# Patient Record
Sex: FEMALE | Race: WHITE
[De-identification: names, ages, dates, MRNs, and addresses within clinical notes are randomized per-mention and may not be internally consistent; named-entity substitution may affect disease eponyms.]

---

## 2017-06-24 ENCOUNTER — HEALTH MAINTENANCE LETTER (OUTPATIENT)
Age: 22
End: 2017-06-24

## 2017-08-08 ENCOUNTER — HOSPITAL ENCOUNTER (EMERGENCY)
Facility: CLINIC | Age: 22
Discharge: HOME | End: 2017-08-09
Attending: EMERGENCY MEDICINE | Admitting: EMERGENCY MEDICINE
Payer: COMMERCIAL

## 2017-08-08 VITALS
BODY MASS INDEX: 21.97 KG/M2 | OXYGEN SATURATION: 98 % | SYSTOLIC BLOOD PRESSURE: 150 MMHG | RESPIRATION RATE: 16 BRPM | TEMPERATURE: 96.8 F | WEIGHT: 140 LBS | HEIGHT: 67 IN | DIASTOLIC BLOOD PRESSURE: 84 MMHG

## 2017-08-08 DIAGNOSIS — Z90.89 STATUS POST TONSILLECTOMY: ICD-10-CM

## 2017-08-08 PROCEDURE — 99282 EMERGENCY DEPT VISIT SF MDM: CPT

## 2017-08-08 NOTE — ED AVS SNAPSHOT
[facility] Emergency Department    [address] [place] [address]    Phone:  [phone]    Fax:  [phone]                                       Dennise Lara   MRN: [id number]    Department:  [facility] Emergency Department   Date of Visit:  8/8/2017           Patient Information     Date Of Birth          1995        Your diagnoses for this visit were:     Status post tonsillectomy        You were seen by Kelsie Gooden MD.      Follow-up Information     Follow up with Dayna Resendez MD.    Specialty:  Family Practice    Why:  As needed    Contact information:    [address] [place] [address]  [phone]          Follow up with Elio Arthur MD. Call in 1 day.    Specialty:  Otolaryngology    Why:  Consider moving your follow up appointment sooner    Contact information:    [facility] OTOLARYNGOLOGY  [address] [place] [address]  [phone]          Discharge Instructions       Call your surgeon tomorrow.  Continue soft diet and pain control.  Return if you have bleeding that will not stop or if you have fever or any other concerning symptoms.     Discharge References/Attachments     ADULT TONSILLECTOMY (ENGLISH)    TONSILLECTOMY, POST OP BLEEDING (ENGLISH)      24 Hour Appointment Hotline       To make an appointment at any [facility], call [phone] ([phone]). If you don't have a family doctor or clinic, we will help you find one. [facility] clinics are conveniently located to serve the needs of you and your family.             Review of your medicines      Our records show that you are taking the medicines listed below. If these are incorrect, please call your family doctor or clinic.        Dose / Directions Last dose taken    multivitamin per tablet   Quantity:  100        ONE DAILY   Refills:  1 YEAR        OXYCODONE HCL PO        Refills:  0                Orders Needing Specimen Collection     None      Pending  Results     No orders found from 8/6/2017 to 8/9/2017.            Pending Culture Results     No orders found from 8/6/2017 to 8/9/2017.            Pending Results Instructions     If you had any lab results that were not finalized at the time of your Discharge, you can call the ED Lab Result RN at [phone]. You will be contacted by this team for any positive Lab results or changes in treatment. The nurses are available 7 days a week from 10A to 6:30P.  You can leave a message 24 hours per day and they will return your call.        Test Results From Your Hospital Stay               Clinical Quality Measure: Blood Pressure Screening     Your blood pressure was checked while you were in the emergency department today. The last reading we obtained was  BP: 150/84 . Please read the guidelines below about what these numbers mean and what you should do about them.  If your systolic blood pressure (the top number) is less than 120 and your diastolic blood pressure (the bottom number) is less than 80, then your blood pressure is normal. There is nothing more that you need to do about it.  If your systolic blood pressure (the top number) is 120-139 or your diastolic blood pressure (the bottom number) is 80-89, your blood pressure may be higher than it should be. You should have your blood pressure rechecked within a year by a primary care provider.  If your systolic blood pressure (the top number) is 140 or greater or your diastolic blood pressure (the bottom number) is 90 or greater, you may have high blood pressure. High blood pressure is treatable, but if left untreated over time it can put you at risk for heart attack, stroke, or kidney failure. You should have your blood pressure rechecked by a primary care provider within the next 4 weeks.  If your provider in the emergency department today gave you specific instructions to follow-up with your doctor or provider even sooner than that, you should follow that  "instruction and not wait for up to 4 weeks for your follow-up visit.        Thank you for choosing [facility]       Thank you for choosing [facility] for your care. Our goal is always to provide you with excellent care. Hearing back from our patients is one way we can continue to improve our services. Please take a few minutes to complete the written survey that you may receive in the mail after you visit with us. Thank you!        [employer] Information     [employer] lets you send messages to your doctor, view your test results, renew your prescriptions, schedule appointments and more. To sign up, go to [email or web address] . Click on \"Log in\" on the left side of the screen, which will take you to the Welcome page. Then click on \"Sign up Now\" on the right side of the page.     You will be asked to enter the access code listed below, as well as some personal information. Please follow the directions to create your username and password.     Your access code is: [email or web address]  Expires: 2017 11:43 PM     Your access code will  in 90 days. If you need help or a new code, please call your [facility] clinic or [phone].        Care EveryWhere ID     This is your Care EveryWhere ID. This could be used by other organizations to access your [facility] medical records  [id number]        Equal Access to Services     JAIMEE ROBINS : Henry Martinez, waaxda luqadaha, qaybta kaalmada adejorge a, roxana moseley . So [facility] [phone].    ATENCIÓN: Si habla español, tiene a issa disposición servicios gratuitos de asistencia lingüística. Llame al [phone].    We comply with applicable federal civil rights laws and [place] laws. We do not discriminate on the basis of race, color, national origin, age, disability sex, sexual orientation or gender identity.            After Visit Summary       This is your record. Keep this with you and show to your community pharmacist(s) and doctor(s) " at your next visit.

## 2017-08-08 NOTE — ED AVS SNAPSHOT
[facility] Emergency Department    [address]    [facility] [address]    Phone:  [phone]    Fax:  [phone]                                       Dennise Lara   MRN: [id number]    Department:  [facility] Emergency Department   Date of Visit:  8/8/2017           After Visit Summary Signature Page     I have received my discharge instructions, and my questions have been answered. I have discussed any challenges I see with this plan with the nurse or doctor.    ..........................................................................................................................................  Patient/Patient Representative Signature      ..........................................................................................................................................  Patient Representative Print Name and Relationship to Patient    ..................................................               ................................................  Date                                            Time    ..........................................................................................................................................  Reviewed by Signature/Title    ...................................................              ..............................................  Date                                                            Time

## 2017-08-09 ASSESSMENT — ENCOUNTER SYMPTOMS
FEVER: 0
VOMITING: 0

## 2017-08-09 NOTE — DISCHARGE INSTRUCTIONS
Call your surgeon tomorrow.  Continue soft diet and pain control.  Return if you have bleeding that will not stop or if you have fever or any other concerning symptoms.

## 2017-08-09 NOTE — ED PROVIDER NOTES
"  History     Chief Complaint:  Post Operative Bleeding    The history is provided by the patient and a parent.      Dennise Lara is a 21 year old female six days status post uncomplicated tonsillectomy who presents to the emergency department with her mother for evaluation of post operative bleeding. The patient states that she has been healing generally well, with only slight postoperative pain well managed with oxycodone, Tylenol, and ibuprofen. However, this evening the patient has had episode of bleeding from her surgical site, which lasted approximately 30-60 minutes in duration. The patient denies any recent fevers or vomiting. On arrival to the ED, the patient states that she believes the bleeding has generally resolved.     Allergies:  Acyclovir  Zithromax [Azithromycin]    Medications:    Oxycodone    Past Medical History:    Allergic rhinitis  Shingles   Varicella    Past Surgical History:    Tonsillectomy    Family History:    No past pertinent family history.    Social History:  Presents with her mother.   Negative for tobacco use.  Negative for alcohol use.  Denies illicit drug use.  Attends college.  Marital Status:  Single [1]    Review of Systems   Constitutional: Negative for fever.   HENT:        Positive for post operative site bleeding (resolved).    Gastrointestinal: Negative for vomiting.     Physical Exam   First Vitals:  BP: 150/84  Heart Rate: 75  Temp: 96.8  F (36  C)  Resp: 16  Height: 170.2 cm (5' 7\")  Weight: 63.5 kg (140 lb)  SpO2: 98 %    Physical Exam  General: Well-developed and well-nourished; well appearing young ; cooperative  Head:  Atraumatic  Eyes:  Extraocular movements intact; conjunctivae, lids, and sclerae are normal  ENT:    Normal nose; moist mucous membranes R tonsillar recess is well healing and without notable findings; R tonsillar recess has a small area inferiorly where there appears to have been recent bleeding; no active bleeding  Neck:  Supple; normal " range of motion  CV:  Regular rate and rhythm; normal heart sounds with no murmurs, rubs, or gallops detected  Resp:  No respiratory distress; clear to auscultation bilaterally without decreased breath sounds, wheezing, rales, or rhonchi  GI:  Soft; non-distended; non-tender    MS:  Normal ROM; no bilateral lower extremity edema  Skin:  Warm; non-diaphoretic; no pallor  Neuro: Awake; A&Ox3; normal strength  Psych:  Normal mood and affect; normal speech  Vitals reviewed.    Emergency Department Course     Emergency Department Course:  Nursing notes and vitals reviewed. I performed an exam of the patient as documented above.     Findings and plan explained to the Patient. Patient discharged home with instructions regarding supportive care, medications, and reasons to return. The importance of close follow-up was reviewed.     Impression & Plan    Medical Decision Making:  Dennise Lara is a 21 year old female who is POD 6 status post tonsillectomy. The patient states that she had about an hour of what sounds like minimal to moderate posterior oropharyngeal bleeding that has spontaneously resolved. On exam, she has no active bleeding. Her surgical sites are very well appearing. I had the patient drink and reevaluated and there is no evidence of active bleed. The patient has no other complaints. I instructed the patient to continue her soft diet and pain control regimen. I instructed her to call her surgeon tomorrow to move her follow up appointment to sooner (currently scheduled for 6 days from now) as she has had this bleeding. I instructed her to return to the ER should the bleeding return and not spontaneously resolve. The patient and her mother verbalized understanding of this plan after all of their questions were answered. They are amenable to discharge.     Diagnosis:    ICD-10-CM   1. Status post tonsillectomy Z90.89       Disposition:  Discharged to home    Lynette GARCIA, am serving as a scribe on  8/8/2017 at 11:34 PM to personally document services performed by Kelise Gooden MD based on my observations and the provider's statements to me.       Lynette Owusu  8/8/2017   [facility] EMERGENCY DEPARTMENT       Kelsie Gooden MD  08/09/17 0207

## 2017-08-09 NOTE — ED NOTES
Patient presents to ED due post op complication. States had tonsilis removed last wed, has been swallowing blood. Denies fever    ABC intact  A/O x4     Reports taking     Tylenol 2200

## 2017-08-10 ENCOUNTER — ANESTHESIA EVENT (OUTPATIENT)
Dept: SURGERY | Facility: CLINIC | Age: 22
End: 2017-08-10
Payer: COMMERCIAL

## 2017-08-10 ENCOUNTER — ANESTHESIA (OUTPATIENT)
Dept: SURGERY | Facility: CLINIC | Age: 22
End: 2017-08-10
Payer: COMMERCIAL

## 2017-08-10 ENCOUNTER — HOSPITAL ENCOUNTER (OUTPATIENT)
Facility: CLINIC | Age: 22
Discharge: HOME | End: 2017-08-10
Attending: EMERGENCY MEDICINE | Admitting: OTOLARYNGOLOGY
Payer: COMMERCIAL

## 2017-08-10 VITALS
WEIGHT: 140 LBS | OXYGEN SATURATION: 97 % | RESPIRATION RATE: 16 BRPM | SYSTOLIC BLOOD PRESSURE: 122 MMHG | TEMPERATURE: 98.4 F | DIASTOLIC BLOOD PRESSURE: 74 MMHG | HEART RATE: 75 BPM | BODY MASS INDEX: 21.93 KG/M2

## 2017-08-10 DIAGNOSIS — J95.830 POST-TONSILLECTOMY HEMORRHAGE: ICD-10-CM

## 2017-08-10 LAB
ANION GAP SERPL CALCULATED.3IONS-SCNC: 8 MMOL/L (ref 3–14)
BUN SERPL-MCNC: 14 MG/DL (ref 7–30)
CALCIUM SERPL-MCNC: 9.1 MG/DL (ref 8.5–10.1)
CHLORIDE SERPL-SCNC: 102 MMOL/L (ref 96–110)
CO2 SERPL-SCNC: 28 MMOL/L (ref 20–32)
CREAT SERPL-MCNC: 0.76 MG/DL (ref 0.5–1)
ERYTHROCYTE [DISTWIDTH] IN BLOOD BY AUTOMATED COUNT: 14.5 % (ref 10–15)
GFR SERPL CREATININE-BSD FRML MDRD: NORMAL ML/MIN/1.7M2
GLUCOSE SERPL-MCNC: 96 MG/DL (ref 70–99)
HCG SERPL QL: NEGATIVE
HCT VFR BLD AUTO: 38.5 % (ref 35–47)
HGB BLD-MCNC: 12.1 G/DL (ref 11.7–15.7)
MCH RBC QN AUTO: 25.9 PG (ref 26.5–33)
MCHC RBC AUTO-ENTMCNC: 31.4 G/DL (ref 31.5–36.5)
MCV RBC AUTO: 82 FL (ref 78–100)
PLATELET # BLD AUTO: 254 10E9/L (ref 150–450)
POTASSIUM SERPL-SCNC: 3.7 MMOL/L (ref 3.4–5.3)
RBC # BLD AUTO: 4.67 10E12/L (ref 3.8–5.2)
SODIUM SERPL-SCNC: 138 MMOL/L (ref 133–144)
WBC # BLD AUTO: 5.1 10E9/L (ref 4–11)

## 2017-08-10 PROCEDURE — 36000050 ZZH SURGERY LEVEL 2 1ST 30 MIN: Performed by: OTOLARYNGOLOGY

## 2017-08-10 PROCEDURE — 71000027 ZZH RECOVERY PHASE 2 EACH 15 MINS: Performed by: OTOLARYNGOLOGY

## 2017-08-10 PROCEDURE — 85027 COMPLETE CBC AUTOMATED: CPT | Performed by: EMERGENCY MEDICINE

## 2017-08-10 PROCEDURE — 25000125 ZZHC RX 250: Performed by: NURSE ANESTHETIST, CERTIFIED REGISTERED

## 2017-08-10 PROCEDURE — 37000009 ZZH ANESTHESIA TECHNICAL FEE, EACH ADDTL 15 MIN: Performed by: OTOLARYNGOLOGY

## 2017-08-10 PROCEDURE — 80048 BASIC METABOLIC PNL TOTAL CA: CPT | Performed by: EMERGENCY MEDICINE

## 2017-08-10 PROCEDURE — 84703 CHORIONIC GONADOTROPIN ASSAY: CPT | Performed by: EMERGENCY MEDICINE

## 2017-08-10 PROCEDURE — 27210794 ZZH OR GENERAL SUPPLY STERILE: Performed by: OTOLARYNGOLOGY

## 2017-08-10 PROCEDURE — 25000132 ZZH RX MED GY IP 250 OP 250 PS 637: Performed by: ANESTHESIOLOGY

## 2017-08-10 PROCEDURE — 25000128 H RX IP 250 OP 636: Performed by: EMERGENCY MEDICINE

## 2017-08-10 PROCEDURE — 37000008 ZZH ANESTHESIA TECHNICAL FEE, 1ST 30 MIN: Performed by: OTOLARYNGOLOGY

## 2017-08-10 PROCEDURE — 25000128 H RX IP 250 OP 636: Performed by: ANESTHESIOLOGY

## 2017-08-10 PROCEDURE — 25000128 H RX IP 250 OP 636: Performed by: NURSE ANESTHETIST, CERTIFIED REGISTERED

## 2017-08-10 PROCEDURE — 25000566 ZZH SEVOFLURANE, EA 15 MIN: Performed by: OTOLARYNGOLOGY

## 2017-08-10 PROCEDURE — 71000013 ZZH RECOVERY PHASE 1 LEVEL 1 EA ADDTL HR: Performed by: OTOLARYNGOLOGY

## 2017-08-10 PROCEDURE — 71000012 ZZH RECOVERY PHASE 1 LEVEL 1 FIRST HR: Performed by: OTOLARYNGOLOGY

## 2017-08-10 PROCEDURE — 99285 EMERGENCY DEPT VISIT HI MDM: CPT | Mod: 25

## 2017-08-10 PROCEDURE — 40000306 ZZH STATISTIC PRE PROC ASSESS II: Performed by: OTOLARYNGOLOGY

## 2017-08-10 PROCEDURE — 27210995 ZZH RX 272: Performed by: OTOLARYNGOLOGY

## 2017-08-10 PROCEDURE — 96374 THER/PROPH/DIAG INJ IV PUSH: CPT | Mod: 59

## 2017-08-10 PROCEDURE — 36000052 ZZH SURGERY LEVEL 2 EA 15 ADDTL MIN: Performed by: OTOLARYNGOLOGY

## 2017-08-10 RX ORDER — LABETALOL HYDROCHLORIDE 5 MG/ML
INJECTION, SOLUTION INTRAVENOUS PRN
Status: DISCONTINUED | OUTPATIENT
Start: 2017-08-10 | End: 2017-08-10

## 2017-08-10 RX ORDER — DEXAMETHASONE SODIUM PHOSPHATE 4 MG/ML
INJECTION, SOLUTION INTRA-ARTICULAR; INTRALESIONAL; INTRAMUSCULAR; INTRAVENOUS; SOFT TISSUE PRN
Status: DISCONTINUED | OUTPATIENT
Start: 2017-08-10 | End: 2017-08-10

## 2017-08-10 RX ORDER — HYDROMORPHONE HYDROCHLORIDE 1 MG/ML
.3-.5 INJECTION, SOLUTION INTRAMUSCULAR; INTRAVENOUS; SUBCUTANEOUS EVERY 10 MIN PRN
Status: DISCONTINUED | OUTPATIENT
Start: 2017-08-10 | End: 2017-08-10 | Stop reason: HOSPADM

## 2017-08-10 RX ORDER — FENTANYL CITRATE 50 UG/ML
25-50 INJECTION, SOLUTION INTRAMUSCULAR; INTRAVENOUS
Status: DISCONTINUED | OUTPATIENT
Start: 2017-08-10 | End: 2017-08-10 | Stop reason: HOSPADM

## 2017-08-10 RX ORDER — SODIUM CHLORIDE, SODIUM LACTATE, POTASSIUM CHLORIDE, CALCIUM CHLORIDE 600; 310; 30; 20 MG/100ML; MG/100ML; MG/100ML; MG/100ML
INJECTION, SOLUTION INTRAVENOUS CONTINUOUS
Status: DISCONTINUED | OUTPATIENT
Start: 2017-08-10 | End: 2017-08-10 | Stop reason: HOSPADM

## 2017-08-10 RX ORDER — ONDANSETRON 4 MG/1
4 TABLET, ORALLY DISINTEGRATING ORAL EVERY 30 MIN PRN
Status: DISCONTINUED | OUTPATIENT
Start: 2017-08-10 | End: 2017-08-10 | Stop reason: HOSPADM

## 2017-08-10 RX ORDER — PROPOFOL 10 MG/ML
INJECTION, EMULSION INTRAVENOUS PRN
Status: DISCONTINUED | OUTPATIENT
Start: 2017-08-10 | End: 2017-08-10

## 2017-08-10 RX ORDER — OXYCODONE HCL 5 MG/5 ML
10 SOLUTION, ORAL ORAL ONCE
Status: COMPLETED | OUTPATIENT
Start: 2017-08-10 | End: 2017-08-10

## 2017-08-10 RX ORDER — ONDANSETRON 2 MG/ML
4 INJECTION INTRAMUSCULAR; INTRAVENOUS ONCE
Status: COMPLETED | OUTPATIENT
Start: 2017-08-10 | End: 2017-08-10

## 2017-08-10 RX ORDER — DEXAMETHASONE SODIUM PHOSPHATE 10 MG/ML
10 INJECTION, SOLUTION INTRAMUSCULAR; INTRAVENOUS ONCE
Status: DISCONTINUED | OUTPATIENT
Start: 2017-08-10 | End: 2017-08-10 | Stop reason: HOSPADM

## 2017-08-10 RX ORDER — SODIUM CHLORIDE 9 MG/ML
INJECTION, SOLUTION INTRAVENOUS CONTINUOUS PRN
Status: DISCONTINUED | OUTPATIENT
Start: 2017-08-10 | End: 2017-08-10

## 2017-08-10 RX ORDER — LIDOCAINE HYDROCHLORIDE 10 MG/ML
INJECTION, SOLUTION INFILTRATION; PERINEURAL PRN
Status: DISCONTINUED | OUTPATIENT
Start: 2017-08-10 | End: 2017-08-10

## 2017-08-10 RX ORDER — NALOXONE HYDROCHLORIDE 0.4 MG/ML
.1-.4 INJECTION, SOLUTION INTRAMUSCULAR; INTRAVENOUS; SUBCUTANEOUS
Status: DISCONTINUED | OUTPATIENT
Start: 2017-08-10 | End: 2017-08-10 | Stop reason: HOSPADM

## 2017-08-10 RX ORDER — MEPERIDINE HYDROCHLORIDE 25 MG/ML
12.5 INJECTION INTRAMUSCULAR; INTRAVENOUS; SUBCUTANEOUS
Status: DISCONTINUED | OUTPATIENT
Start: 2017-08-10 | End: 2017-08-10 | Stop reason: HOSPADM

## 2017-08-10 RX ORDER — FENTANYL CITRATE 50 UG/ML
INJECTION, SOLUTION INTRAMUSCULAR; INTRAVENOUS PRN
Status: DISCONTINUED | OUTPATIENT
Start: 2017-08-10 | End: 2017-08-10

## 2017-08-10 RX ORDER — LIDOCAINE 40 MG/G
CREAM TOPICAL
Status: DISCONTINUED | OUTPATIENT
Start: 2017-08-10 | End: 2017-08-10 | Stop reason: HOSPADM

## 2017-08-10 RX ORDER — ONDANSETRON 2 MG/ML
INJECTION INTRAMUSCULAR; INTRAVENOUS PRN
Status: DISCONTINUED | OUTPATIENT
Start: 2017-08-10 | End: 2017-08-10

## 2017-08-10 RX ORDER — ONDANSETRON 2 MG/ML
4 INJECTION INTRAMUSCULAR; INTRAVENOUS EVERY 30 MIN PRN
Status: DISCONTINUED | OUTPATIENT
Start: 2017-08-10 | End: 2017-08-10 | Stop reason: HOSPADM

## 2017-08-10 RX ADMIN — SODIUM CHLORIDE, POTASSIUM CHLORIDE, SODIUM LACTATE AND CALCIUM CHLORIDE: 600; 310; 30; 20 INJECTION, SOLUTION INTRAVENOUS at 04:12

## 2017-08-10 RX ADMIN — SODIUM CHLORIDE: 9 INJECTION, SOLUTION INTRAVENOUS at 02:46

## 2017-08-10 RX ADMIN — ONDANSETRON 4 MG: 2 INJECTION INTRAMUSCULAR; INTRAVENOUS at 02:40

## 2017-08-10 RX ADMIN — OXYCODONE HYDROCHLORIDE 10 MG: 5 SOLUTION ORAL at 06:15

## 2017-08-10 RX ADMIN — SODIUM CHLORIDE, POTASSIUM CHLORIDE, SODIUM LACTATE AND CALCIUM CHLORIDE: 600; 310; 30; 20 INJECTION, SOLUTION INTRAVENOUS at 03:50

## 2017-08-10 RX ADMIN — Medication 100 MG: at 03:08

## 2017-08-10 RX ADMIN — MEPERIDINE HYDROCHLORIDE 12.5 MG: 25 INJECTION, SOLUTION INTRAMUSCULAR; INTRAVENOUS; SUBCUTANEOUS at 04:22

## 2017-08-10 RX ADMIN — ONDANSETRON 4 MG: 2 INJECTION INTRAMUSCULAR; INTRAVENOUS at 03:59

## 2017-08-10 RX ADMIN — MIDAZOLAM HYDROCHLORIDE 2 MG: 1 INJECTION, SOLUTION INTRAMUSCULAR; INTRAVENOUS at 03:05

## 2017-08-10 RX ADMIN — FENTANYL CITRATE 100 MCG: 50 INJECTION, SOLUTION INTRAMUSCULAR; INTRAVENOUS at 03:08

## 2017-08-10 RX ADMIN — ROCURONIUM BROMIDE 7 MG: 10 INJECTION INTRAVENOUS at 03:08

## 2017-08-10 RX ADMIN — FENTANYL CITRATE 50 MCG: 50 INJECTION, SOLUTION INTRAMUSCULAR; INTRAVENOUS at 04:23

## 2017-08-10 RX ADMIN — PROPOFOL 50 MG: 10 INJECTION, EMULSION INTRAVENOUS at 03:16

## 2017-08-10 RX ADMIN — SODIUM CHLORIDE 1000 ML: 9 INJECTION, SOLUTION INTRAVENOUS at 02:40

## 2017-08-10 RX ADMIN — DEXAMETHASONE SODIUM PHOSPHATE 4 MG: 4 INJECTION, SOLUTION INTRA-ARTICULAR; INTRALESIONAL; INTRAMUSCULAR; INTRAVENOUS; SOFT TISSUE at 03:08

## 2017-08-10 RX ADMIN — LABETALOL HYDROCHLORIDE 5 MG: 5 INJECTION, SOLUTION INTRAVENOUS at 03:45

## 2017-08-10 RX ADMIN — PROPOFOL 150 MG: 10 INJECTION, EMULSION INTRAVENOUS at 03:08

## 2017-08-10 RX ADMIN — FENTANYL CITRATE 50 MCG: 50 INJECTION, SOLUTION INTRAMUSCULAR; INTRAVENOUS at 06:15

## 2017-08-10 RX ADMIN — FENTANYL CITRATE 50 MCG: 50 INJECTION, SOLUTION INTRAMUSCULAR; INTRAVENOUS at 05:00

## 2017-08-10 RX ADMIN — LIDOCAINE HYDROCHLORIDE 30 MG: 10 INJECTION, SOLUTION INFILTRATION; PERINEURAL at 03:08

## 2017-08-10 ASSESSMENT — ENCOUNTER SYMPTOMS
SEIZURES: 0
DYSRHYTHMIAS: 0

## 2017-08-10 ASSESSMENT — COPD QUESTIONNAIRES: COPD: 0

## 2017-08-10 NOTE — IP AVS SNAPSHOT
[facility] PreOP/PostOP    [address]    [facility] [address]    Phone:  [phone]    Fax:  [phone]                                       After Visit Summary   8/10/2017    Dennise Lara    MRN: [id number]           After Visit Summary Signature Page     I have received my discharge instructions, and my questions have been answered. I have discussed any challenges I see with this plan with the nurse or doctor.    ..........................................................................................................................................  Patient/Patient Representative Signature      ..........................................................................................................................................  Patient Representative Print Name and Relationship to Patient    ..................................................               ................................................  Date                                            Time    ..........................................................................................................................................  Reviewed by Signature/Title    ...................................................              ..............................................  Date                                                            Time

## 2017-08-10 NOTE — ED PROVIDER NOTES
History     Chief Complaint:  Post-op Problem      The history is provided by the patient.      Dennise Lara is a 21 year old female who presents with post-op problems. On 08/02/17 the patient had a tonsillectomy at [facility] by Dr. Arthur. The patient presented to the emergency department last night for left-sided post-op tonsillectomy bleeding. Tonight she presents with right-sided bleeding with some relief from gargling ice water. She is currently on oxycodone prescribed for pain after the surgery. She has no familial history of bleeding disorder. She has no other complaints     Allergies:  Acyclovir  Zithromax [Azithromycin]     Medications:    Oxycodone     Past Medical History:    Allergic rhinitis  Shingles    Past Surgical History:    Tonsillectomy 8/2/17    Family History:    Allergies    Social History:  Presents with mother   Tobacco use: Never  Alcohol use: Negative  PCP: Angelica Mcknight    Marital Status:  Single    Review of Systems   HENT:        (+) Tonsillar bleeding    All other systems reviewed and are negative.      Physical Exam     Patient Vitals for the past 24 hrs:   BP Temp Temp src Pulse Resp SpO2 Weight   08/10/17 0245 146/84 - - - 18 100 % -   08/10/17 0234 - - - - - 99 % -   08/10/17 0220 - - - - - 99 % -   08/10/17 0206 - - - - - 98 % -   08/10/17 0156 128/74 98  F (36.7  C) Oral 75 17 99 % 63.5 kg (140 lb)   08/10/17 0152 131/82 - - - - - -        Physical Exam  Nursing note and vitals reviewed.  Constitutional: Cooperative. Sitting blood into emesis basin.  HENT:   Mouth/Throat: Mucous membranes are normal. Large clot burden in the left tonsillar pillar. Active bleeding from the right.   Cardiovascular: Normal rate, regular rhythm and normal heart sounds.  No murmur.  Pulmonary/Chest: Effort normal and breath sounds normal. No respiratory distress.   Abdominal: Soft. Normal appearance. There is no tenderness.   Neurological: Alert.   Skin: Skin is warm and dry.   Psychiatric:  Normal mood and affect.      Emergency Department Course   Laboratory:  CBC: WNL (WBC 5.1, HGB 12.1, )   BMP: WNL (Creatinine 0.76)     HCG qual: Negative    Interventions:  0240: NS 1L IV Bolus    0240: Zofran 4 mg IV      Emergency Department Course:  Past medical records, nursing notes, and vitals reviewed.  0215: I performed an exam of the patient and obtained history, as documented above.   IV inserted and blood drawn.   0222: Discussed patient with Dr. Arthur, surgeon who performed tonsillectomy. They agreed to come see patient.   Patient transferred to OR under care of Dr. Arthur.     Impression & Plan    Medical Decision Making:  Dennise Lara is a 21 year old female post op day 8 from a tonsillectomy who presents with post-tonsillectomy hemorrhage. There is large clot burden in the left tonsillar pillar as well as active bleeding from the right. Due to this being her second visit for hemorrhage in the last 24 hours I did discuss the case with her ENT who has graciously agreed to come in and evaluate her in the operative setting. We will send labs, obtain a pregnancy test, and provide fluids as well as nausea medication in the interim. She is hemodynamically stable and will be transferred to the OR in stable condition for definitive management.     Diagnosis:    ICD-10-CM   1. Post-tonsillectomy hemorrhage J95.89       Disposition:  Patient brought to OR.       Darren Funk  8/10/2017   [facility] EMERGENCY DEPARTMENT  Darren GARCIA, am serving as a scribe at 2:17 AM on 8/10/2017 to document services personally performed by Michel Echeverria MD based on my observations and the provider's statements to me.        Michel Echeverria MD  08/10/17 0411

## 2017-08-10 NOTE — ED NOTES
Was seen last night with bleeding left side  So now for past 40 min  Again bleeding   Looks more like left side now   Here for eval   Did give ice water has been swish and spit  Bleeding slowing down

## 2017-08-10 NOTE — BRIEF OP NOTE
[facility] Brief Operative Note    Pre-operative diagnosis: postop tonsil bleed    Post-operative diagnosis same   Procedure: Procedure(s):  control of post op tonsil bleed  - Wound Class: II-Clean Contaminated   Surgeon(s): Surgeon(s) and Role:     * Elio Arthur MD - Primary   Estimated blood loss: * No values recorded between 8/10/2017  3:14 AM and 8/10/2017  4:03 AM *    Specimens: * No specimens in log *   Findings: Diffuse granulation tissue bleed bilaterally with a single upper pole vessel on the left

## 2017-08-10 NOTE — IP AVS SNAPSHOT
MRN:[id number]                      After Visit Summary   8/10/2017    Dennise Lara    MRN: [id number]           Thank you!     Thank you for choosing [facility] for your care. Our goal is always to provide you with excellent care. Hearing back from our patients is one way we can continue to improve our services. Please take a few minutes to complete the written survey that you may receive in the mail after you visit. If you would like to speak to someone directly about your visit please contact Patient Relations at [phone]. Thank you!          Patient Information     Date Of Birth          1995        About your hospital stay     You were admitted on:  N/A You last received care in the:  [facility] PreOP/PostOP    You were discharged on:  August 10, 2017       Who to Call     For medical emergencies, please call 911.  For non-urgent questions about your medical care, please call your primary care provider or clinic, [phone]  For questions related to your surgery, please call your surgery clinic        Attending Provider     Provider Specialty    Michel Echeverria MD Emergency Medicine       Primary Care Provider Office Phone # Fax #    Angelica Mcknight [phone]      After Care Instructions     Diet Instructions       Soft diet as tolerated            Discharge Instructions        Return to clinic as instructed by Physician                  Further instructions from your care team       TONSILLECTOMY AND ADENOIDECTOMY   DISCHARGE INSTRUCTIONS  [facility]   Berhane Ac M.D.    Tim Jimenes M.D.  Elio Arthur M.D.       Michel Thompson M.D.   Heather Reyes M.D.          hPani Jasso M.D.                                                                                                        1. Patients should eat only soft foods for at least one week after surgery.     2. Avoid citrus juices (which may burn) or any food which  might scratch the throat and cause bleeding. The sooner patients eat and chew, the sooner they will feel better.    3. If the patient is nauseated or vomiting give clear liquids only, and avoid dairy products or other fatty foods. Use plain Tylenol instead of the narcotic pain medication.  Hold the narcotic until nausea has passed and oral intake has improved.  When you restart the narcotic pain medication, stop the Tylenol.    4. Patients may run a fever for up to 10 days after surgery.  This may occasionally be as high as 101 degrees.  Contact your physician for a persisting fever in this range.    5. Patients may complain of an earache. This is usually referred pain from the throat and may be especially prominent around day six or seven after surgery, which is often when throat pain from tonsillectomy peaks.        6. Tylenol may be substituted for your prescription pain medication.  Do not give Tylenol in addition to your prescription because an overdose of acetaminophen may occur.  Ibuprofen may in some cases be used to supplement your narcotic pain medication.  Contact your surgeon prior to starting ibuprofen to discuss if this is a good choice for you.  There may be a slightly increased risk of post operative bleeding with ibuprofen use.     7. Observe patient for difficulty breathing which may be caused by airway swelling or sedation from narcotic pain medication.    8. Patients should avoid any strenuous activity such as heavy lifting, active sports or games for at least two weeks.  It is best to stay in the [facility] area for the two week healing period.    9. Notify the doctor if there is any bleeding. Bleeding may be helped by gargling ice water if possible.    10. White areas will appear in the back of the throat after the tonsils have been removed. This is normal and will gradually disappear.    11. The patient s breath may have a foul odor.  This may be present for ten days.     12. If you  have any problems or questions, please call [phone], 24 hours a day.  GENERAL ANESTHESIA OR SEDATION ADULT DISCHARGE INSTRUCTIONS   SPECIAL PRECAUTIONS FOR 24 HOURS AFTER SURGERY    IT IS NOT UNUSUAL TO FEEL LIGHT-HEADED OR FAINT, UP TO 24 HOURS AFTER SURGERY OR WHILE TAKING PAIN MEDICATION.  IF YOU HAVE THESE SYMPTOMS; SIT FOR A FEW MINUTES BEFORE STANDING AND HAVE SOMEONE ASSIST YOU WHEN YOU GET UP TO WALK OR USE THE BATHROOM.    YOU SHOULD REST AND RELAX FOR THE NEXT 24 HOURS AND YOU MUST MAKE ARRANGEMENTS TO HAVE SOMEONE STAY WITH YOU FOR AT LEAST 24 HOURS AFTER YOUR DISCHARGE.  AVOID HAZARDOUS AND STRENUOUS ACTIVITIES.  DO NOT MAKE IMPORTANT DECISIONS FOR 24 HOURS.    DO NOT DRIVE ANY VEHICLE OR OPERATE MECHANICAL EQUIPMENT FOR 24 HOURS FOLLOWING THE END OF YOUR SURGERY.  EVEN THOUGH YOU MAY FEEL NORMAL, YOUR REACTIONS MAY BE AFFECTED BY THE MEDICATION YOU HAVE RECEIVED.    DO NOT DRINK ALCOHOLIC BEVERAGES FOR 24 HOURS FOLLOWING YOUR SURGERY.    DRINK CLEAR LIQUIDS (APPLE JUICE, GINGER ALE, 7-UP, BROTH, ETC.).  PROGRESS TO YOUR REGULAR DIET AS YOU FEEL ABLE.    YOU MAY HAVE A DRY MOUTH, A SORE THROAT, MUSCLES ACHES OR TROUBLE SLEEPING.  THESE SHOULD GO AWAY AFTER 24 HOURS.    CALL YOUR DOCTOR FOR ANY OF THE FOLLOWING:  SIGNS OF INFECTION (FEVER, GROWING TENDERNESS AT THE SURGERY SITE, A LARGE AMOUNT OF DRAINAGE OR BLEEDING, SEVERE PAIN, FOUL-SMELLING DRAINAGE, REDNESS OR SWELLING.    IT HAS BEEN OVER 8 TO 10 HOURS SINCE SURGERY AND YOU ARE STILL NOT ABLE TO URINATE (PASS WATER).     Maximum acetaminophen (Tylenol) dose from all sources should not exceed 4 grams (4000 mg) per day.            Pending Results     No orders found from 8/8/2017 to 8/11/2017.            Admission Information     Date & Time Department Dept. Phone    8/10/2017 [facility] PreOP/PostOP [phone]      Your Vitals Were     Blood Pressure Pulse Temperature Respirations Weight Last Period    124/76 75 98.2  F (36.8  C)  "(Temporal) 16 63.5 kg (140 lb) 2017    Pulse Oximetry BMI (Body Mass Index)                97% 21.93 kg/m2          [employer] Information     [employer] lets you send messages to your doctor, view your test results, renew your prescriptions, schedule appointments and more. To sign up, go to [email or web address] . Click on \"Log in\" on the left side of the screen, which will take you to the Welcome page. Then click on \"Sign up Now\" on the right side of the page.     You will be asked to enter the access code listed below, as well as some personal information. Please follow the directions to create your username and password.     Your access code is: [email or web address]  Expires: 2017 11:43 PM     Your access code will  in 90 days. If you need help or a new code, please call your [place] clinic or [phone].        Care EveryWhere ID     This is your Care EveryWhere ID. This could be used by other organizations to access your [place] medical records  [id number]        Equal Access to Services     JAIMEE ROBINS : Hadmarybel Martinez, elonid nash, mely carter, roxana marquez. So [facility] [phone].    ATENCIÓN: Si habla español, tiene a issa disposición servicios gratuitos de asistencia lingüística. Anamame al [phone].    We comply with applicable federal civil rights laws and [place] laws. We do not discriminate on the basis of race, color, national origin, age, disability sex, sexual orientation or gender identity.               Review of your medicines      CONTINUE these medicines which have NOT CHANGED        Dose / Directions    OXYCODONE HCL PO        Refills:  0                Protect others around you: Learn how to safely use, store and throw away your medicines at www.disposemymeds.org.             Medication List: This is a list of all your medications and when to take them. Check marks below indicate your daily home schedule. Keep this " list as a reference.      Medications           Morning Afternoon Evening Bedtime As Needed    OXYCODONE HCL PO

## 2017-08-10 NOTE — PROGRESS NOTES
Patient 8 days s/p tonsillectomy with rebeed x2 past 24 hours. Will returnto or for examination and control of bleeding.   Elio Arthur M.D.

## 2017-08-10 NOTE — DISCHARGE INSTRUCTIONS
TONSILLECTOMY AND ADENOIDECTOMY   DISCHARGE INSTRUCTIONS  [facility]   Berhane Ac M.D.    Tim Jimenes M.D.  Elio Arthur M.D.       Michel Thompson M.D.   Heather Reyes M.D.          Phani Jasso M.D.                                                                                                        1. Patients should eat only soft foods for at least one week after surgery.     2. Avoid citrus juices (which may burn) or any food which might scratch the throat and cause bleeding. The sooner patients eat and chew, the sooner they will feel better.    3. If the patient is nauseated or vomiting give clear liquids only, and avoid dairy products or other fatty foods. Use plain Tylenol instead of the narcotic pain medication.  Hold the narcotic until nausea has passed and oral intake has improved.  When you restart the narcotic pain medication, stop the Tylenol.    4. Patients may run a fever for up to 10 days after surgery.  This may occasionally be as high as 101 degrees.  Contact your physician for a persisting fever in this range.    5. Patients may complain of an earache. This is usually referred pain from the throat and may be especially prominent around day six or seven after surgery, which is often when throat pain from tonsillectomy peaks.        6. Tylenol may be substituted for your prescription pain medication.  Do not give Tylenol in addition to your prescription because an overdose of acetaminophen may occur.  Ibuprofen may in some cases be used to supplement your narcotic pain medication.  Contact your surgeon prior to starting ibuprofen to discuss if this is a good choice for you.  There may be a slightly increased risk of post operative bleeding with ibuprofen use.     7. Observe patient for difficulty breathing which may be caused by airway swelling or sedation from narcotic pain medication.    8. Patients should avoid any strenuous activity such as heavy lifting, active sports  or games for at least two weeks.  It is best to stay in the [facility] area for the two week healing period.    9. Notify the doctor if there is any bleeding. Bleeding may be helped by gargling ice water if possible.    10. White areas will appear in the back of the throat after the tonsils have been removed. This is normal and will gradually disappear.    11. The patient s breath may have a foul odor.  This may be present for ten days.     12. If you have any problems or questions, please call [phone], 24 hours a day.  GENERAL ANESTHESIA OR SEDATION ADULT DISCHARGE INSTRUCTIONS   SPECIAL PRECAUTIONS FOR 24 HOURS AFTER SURGERY    IT IS NOT UNUSUAL TO FEEL LIGHT-HEADED OR FAINT, UP TO 24 HOURS AFTER SURGERY OR WHILE TAKING PAIN MEDICATION.  IF YOU HAVE THESE SYMPTOMS; SIT FOR A FEW MINUTES BEFORE STANDING AND HAVE SOMEONE ASSIST YOU WHEN YOU GET UP TO WALK OR USE THE BATHROOM.    YOU SHOULD REST AND RELAX FOR THE NEXT 24 HOURS AND YOU MUST MAKE ARRANGEMENTS TO HAVE SOMEONE STAY WITH YOU FOR AT LEAST 24 HOURS AFTER YOUR DISCHARGE.  AVOID HAZARDOUS AND STRENUOUS ACTIVITIES.  DO NOT MAKE IMPORTANT DECISIONS FOR 24 HOURS.    DO NOT DRIVE ANY VEHICLE OR OPERATE MECHANICAL EQUIPMENT FOR 24 HOURS FOLLOWING THE END OF YOUR SURGERY.  EVEN THOUGH YOU MAY FEEL NORMAL, YOUR REACTIONS MAY BE AFFECTED BY THE MEDICATION YOU HAVE RECEIVED.    DO NOT DRINK ALCOHOLIC BEVERAGES FOR 24 HOURS FOLLOWING YOUR SURGERY.    DRINK CLEAR LIQUIDS (APPLE JUICE, GINGER ALE, 7-UP, BROTH, ETC.).  PROGRESS TO YOUR REGULAR DIET AS YOU FEEL ABLE.    YOU MAY HAVE A DRY MOUTH, A SORE THROAT, MUSCLES ACHES OR TROUBLE SLEEPING.  THESE SHOULD GO AWAY AFTER 24 HOURS.    CALL YOUR DOCTOR FOR ANY OF THE FOLLOWING:  SIGNS OF INFECTION (FEVER, GROWING TENDERNESS AT THE SURGERY SITE, A LARGE AMOUNT OF DRAINAGE OR BLEEDING, SEVERE PAIN, FOUL-SMELLING DRAINAGE, REDNESS OR SWELLING.    IT HAS BEEN OVER 8 TO 10 HOURS SINCE SURGERY AND YOU ARE STILL NOT  ABLE TO URINATE (PASS WATER).     Maximum acetaminophen (Tylenol) dose from all sources should not exceed 4 grams (4000 mg) per day.

## 2017-08-10 NOTE — ANESTHESIA PREPROCEDURE EVALUATION
Anesthesia Evaluation     .             ROS/MED HX    ENT/Pulmonary:      (-) COPD, sleep apnea and Other pulmonary disease   Neurologic:      (-) seizures, Other neuro hx and Delerium   Cardiovascular:        (-) hypertension, CAD, CHF, arrhythmias, pulmonary hypertension and dyslipidemia   METS/Exercise Tolerance:     Hematologic:        (-) anemia   Musculoskeletal:        (-) arthritis   GI/Hepatic:        (-) GERD, hiatal hernia and hepatitis   Renal/Genitourinary:      (-) renal disease   Endo:      (-) Type I DM, Type II DM, thyroid disease, chronic steroid usage and obesity   Psychiatric:        (-) psychiatric history   Infectious Disease:  - neg infectious disease ROS       Malignancy:      - no malignancy   Other:    - neg other ROS                 Physical Exam      Airway   Mallampati: II  TM distance: >3 FB  Neck ROM: full    Dental     Cardiovascular   Rhythm and rate: regular and normal  (-) no murmur    Pulmonary    breath sounds clear to auscultation    Other findings: Lab Test        08/10/17                       0232          WBC          5.1           HGB          12.1          MCV          82            PLT          254            No lab results found.                Anesthesia Plan      History & Physical Review  History and physical reviewed and following examination; no interval change.    ASA Status:  1 emergent.    NPO Status:  > 8 hours    Plan for General and ETT with Propofol induction. Maintenance will be Balanced.    PONV prophylaxis:  Ondansetron (or other 5HT-3) and Dexamethasone or Solumedrol       Postoperative Care  Postoperative pain management:  IV analgesics, Oral pain medications and Neuraxial analgesia.      Consents  Anesthetic plan, risks, benefits and alternatives discussed with:  Patient and Parent (Mother and/or Father).  Use of blood products discussed: Yes.   Use of blood products discussed with Patient.  Consented to blood products.  .                          .

## 2017-08-10 NOTE — ANESTHESIA CARE TRANSFER NOTE
Patient: Dennise Lara    Procedure(s):  control of post op tonsil bleed  - Wound Class: II-Clean Contaminated    Diagnosis: postop tonsil bleed   Diagnosis Additional Information: No value filed.    Anesthesia Type:   General, ETT     Note:  Airway :Face Mask  Patient transferred to:PACU  Comments: vss      Vitals: (Last set prior to Anesthesia Care Transfer)    CRNA VITALS  8/10/2017 0324 - 8/10/2017 0401      8/10/2017             Resp Rate (observed): (!)  3                Electronically Signed By: BURTON Rice CRNA  August 10, 2017  4:01 AM

## 2017-08-11 NOTE — OP NOTE
PROCEDURE/SERVICE DATE:  08/10/2017.      PREOPERATIVE DIAGNOSIS:  Post tonsillectomy bleeding.      POSTOPERATIVE DIAGNOSIS:  Post tonsillectomy bleeding.      OPERATIVE PROCEDURE PERFORMED:  Control of post tonsillectomy bleeding.      PREOPERATIVE HISTORY AND INDICATIONS:  Dennise Lara is a 21-year-old female who underwent tonsillectomy on 08/02/2017 without complication.  She did well until the morning of 08/09/2017, when she had some mild bleeding.  This stopped spontaneously, and she did well until early in the morning of 08/10/2017, when bleeding occurred.  This bleeding was more substantial, and she presented to the Emergency Room at [facility].  It was felt that due to her recurrent bleeding, she is to be returned to the operating room for examination and control of post tonsillectomy bleeding.      OPERATIVE PROCEDURE:  The patient was taken to the operating room, and a general endotracheal anesthetic was administered using a rapid induction technique.  With endotracheal tube firmly in position, the patient's oral airway was suctioned clear of bleeding and the McIvor mouth retractor was placed.  There was found to be mild diffuse oozing from the right tonsillar fossae.  This was controlled easily with superficial cautery.  There was no specific vessel that was noted.  The McIvor mouth retractor was repositioned slightly.  On the left side, there was a probable arterial vessel in the superior pole with modest bleeding noted.  This was controlled with cautery.  Again, general oozing throughout in regions of granulation tissue was treated with cautery and hemostasis assured.  The pharynx and nasopharynx were irrigated and suctioned clear of any blood or debris.  At this time, the McIvor mouth retractor was let down and the patient was allowed to be observed for a period of approximately 5 minutes.  Retractor was now placed and tonsillar fossae were examined.  Two small areas of slight oozing  were noted on the right side, and these were read cauterized.  At this time, a gastric catheter was passed via the oral cavity to eliminate any blood from the stomach.  The patient was then awakened, extubated and taken to the recovery area in satisfactory condition without complication and with an estimated blood loss of 50 cc.         WERO ARTHUR III, MD             D: 08/10/2017 12:46   T: 2017 09:54   MT: LANDEN#[id number]      Name:     ZAKI WEEKS   MRN:      [id number]        Account:        [id number]   :      1995           Procedure Date: 08/10/2017      Document: [id number]       cc: Angelica Arthur III, MD

## 2017-08-12 ENCOUNTER — HOSPITAL ENCOUNTER (EMERGENCY)
Facility: CLINIC | Age: 22
Discharge: HOME | End: 2017-08-12
Attending: EMERGENCY MEDICINE | Admitting: EMERGENCY MEDICINE
Payer: COMMERCIAL

## 2017-08-12 VITALS
TEMPERATURE: 97.4 F | RESPIRATION RATE: 16 BRPM | SYSTOLIC BLOOD PRESSURE: 121 MMHG | OXYGEN SATURATION: 96 % | DIASTOLIC BLOOD PRESSURE: 74 MMHG

## 2017-08-12 DIAGNOSIS — J95.830 POST-TONSILLECTOMY HEMORRHAGE: ICD-10-CM

## 2017-08-12 DIAGNOSIS — Z90.89 POST-TONSILLECTOMY PAIN: ICD-10-CM

## 2017-08-12 DIAGNOSIS — G89.18 POST-TONSILLECTOMY PAIN: ICD-10-CM

## 2017-08-12 LAB
APTT PPP: 35 SEC (ref 22–37)
HGB BLD-MCNC: 11.6 G/DL (ref 11.7–15.7)
INR PPP: 1.04 (ref 0.86–1.14)

## 2017-08-12 PROCEDURE — 99285 EMERGENCY DEPT VISIT HI MDM: CPT | Mod: 25

## 2017-08-12 PROCEDURE — 96376 TX/PRO/DX INJ SAME DRUG ADON: CPT

## 2017-08-12 PROCEDURE — 42960 CONTROL THROAT BLEEDING: CPT

## 2017-08-12 PROCEDURE — 36415 COLL VENOUS BLD VENIPUNCTURE: CPT | Performed by: EMERGENCY MEDICINE

## 2017-08-12 PROCEDURE — 85018 HEMOGLOBIN: CPT | Performed by: EMERGENCY MEDICINE

## 2017-08-12 PROCEDURE — 85610 PROTHROMBIN TIME: CPT | Performed by: EMERGENCY MEDICINE

## 2017-08-12 PROCEDURE — 85730 THROMBOPLASTIN TIME PARTIAL: CPT | Performed by: EMERGENCY MEDICINE

## 2017-08-12 PROCEDURE — 25000132 ZZH RX MED GY IP 250 OP 250 PS 637: Performed by: EMERGENCY MEDICINE

## 2017-08-12 PROCEDURE — 96374 THER/PROPH/DIAG INJ IV PUSH: CPT | Mod: 59

## 2017-08-12 PROCEDURE — 25000128 H RX IP 250 OP 636: Performed by: EMERGENCY MEDICINE

## 2017-08-12 RX ORDER — FENTANYL CITRATE 50 UG/ML
50 INJECTION, SOLUTION INTRAMUSCULAR; INTRAVENOUS ONCE
Status: COMPLETED | OUTPATIENT
Start: 2017-08-12 | End: 2017-08-12

## 2017-08-12 RX ORDER — OXYCODONE HCL 5 MG/5 ML
10 SOLUTION, ORAL ORAL ONCE
Status: COMPLETED | OUTPATIENT
Start: 2017-08-12 | End: 2017-08-12

## 2017-08-12 RX ORDER — FENTANYL CITRATE 50 UG/ML
50 INJECTION, SOLUTION INTRAMUSCULAR; INTRAVENOUS
Status: COMPLETED | OUTPATIENT
Start: 2017-08-12 | End: 2017-08-12

## 2017-08-12 RX ADMIN — FENTANYL CITRATE 50 MCG: 50 INJECTION, SOLUTION INTRAMUSCULAR; INTRAVENOUS at 10:53

## 2017-08-12 RX ADMIN — OXYCODONE HYDROCHLORIDE 10 MG: 5 SOLUTION ORAL at 12:45

## 2017-08-12 RX ADMIN — FENTANYL CITRATE 50 MCG: 50 INJECTION, SOLUTION INTRAMUSCULAR; INTRAVENOUS at 11:28

## 2017-08-12 ASSESSMENT — ENCOUNTER SYMPTOMS: WEAKNESS: 0

## 2017-08-12 NOTE — ED AVS SNAPSHOT
[facility]    [address]    [facility] [address]    Phone:  [phone]    Fax:  [phone]                                       Dennise Lara   MRN: [id number]    Department:  [facility]   Date of Visit:  8/12/2017           After Visit Summary Signature Page     I have received my discharge instructions, and my questions have been answered. I have discussed any challenges I see with this plan with the nurse or doctor.    ..........................................................................................................................................  Patient/Patient Representative Signature      ..........................................................................................................................................  Patient Representative Print Name and Relationship to Patient    ..................................................               ................................................  Date                                            Time    ..........................................................................................................................................  Reviewed by Signature/Title    ...................................................              ..............................................  Date                                                            Time

## 2017-08-12 NOTE — ED NOTES
Tonsillectomy Aug. 2nd. Currently feels blood going down back of throat and spitting blood as well.  Has been in this ED for same issue twice since Aug. 2nd.  Cauterization performed at one of the visits. Rates pain 4/10. Feels weak. Slight nausea.

## 2017-08-12 NOTE — PROGRESS NOTES
IMP: post tonsillectomy bleeding - left tonsillar fossa inferiorly    RECC: the area of bleeding was oozing and is suspected granulation tissue bleeding.  The area was cauterized in the ED.  She was observed for 30 minutes and rechecked and no further bleeding.  She should be observed in the ED for one additional hour.  Her father is with her and he understands to bring her back to ED and call me if she has any more bleeding.  Threshold for going to the OR is low if any more bleeding.  Order INR and PTT.        CC: throat bleeding after previous tonsillectomy    HPI: 20 yo female had a tonsillectomy 8/2 and then had postop bleeding 8/10 to control bleeding.   There was a superior pole vessel cauterized.  She has had recurrent bleeding overnight today.  The bleeding began to more this morning so she came back to the ED.    Past Medical History:   Diagnosis Date     Allergic rhinitis      Shingles 2003     Current Facility-Administered Medications   Medication     lidocaine (viscous) (XYLOCAINE) 2 % solution 15 mL     silver nitrate (ARZOL) Misc 4 applicator     silver nitrate (ARZOL) Misc 4 applicator     Current Outpatient Prescriptions   Medication     OXYCODONE HCL PO     Allergies   Allergen Reactions     Acyclovir      vomiting     Zithromax [Azithromycin] Hives     Social History     Social History     Marital status: Single     Spouse name: N/A     Number of children: N/A     Years of education: N/A     Occupational History     Not on file.     Social History Main Topics     Smoking status: Never Smoker     Smokeless tobacco: Never Used     Alcohol use No     Drug use: No     Sexual activity: Not on file     Other Topics Concern     Not on file     Social History Narrative     Family History   Problem Relation Age of Onset     Genetic Disorder Maternal Grandmother      kidney disease in family     Blood Disease Maternal Grandfather      high platelets     Allergies Mother      Allergies Father      No bleeding  problems in family or personally.    EXAM  BP (!) 138/93  Temp 97.4  F (36.3  C) (Temporal)  Resp 16  LMP 07/22/2017  SpO2 96%  OC/OP: right tonsillar fossa with healing, no clots. Left tonsillar fossa has a large clot filling the fossa, no active bleeding.    Lab: [facility] 11.6 from 12.1 on 8/10      Procedure note: the clots on the left side of the throat were removed with suction and she swished and spit lidocaine.  The area of oozing is inferiorly and was cauterized with silver nitrate.  After several minutes of rinsing and waiting the area was rechecked and no bleeidng.  She did gag during the procedure and vomited a large amount of old blood.

## 2017-08-12 NOTE — ED AVS SNAPSHOT
[facility] Emergency Department    [address]    [facility] [place] [address]    Phone:  [phone]    Fax:  [phone]                                       Dennise Lara   MRN: [id number]    Department:  [facility] Emergency Department   Date of Visit:  8/12/2017           Patient Information     Date Of Birth          1995        Your diagnoses for this visit were:     Post-tonsillectomy hemorrhage     Post-tonsillectomy pain        You were seen by Xiomara Edmondson MD.      Follow-up Information     Follow up with Dr. Arthur.    Why:  as scheduled early next week        Follow up with [facility] Emergency Department.    Specialty:  EMERGENCY MEDICINE    Why:  As needed, If symptoms worsen    Contact information:    [address]  [facility] [address] [phone]      Discharge References/Attachments     TONSILLECTOMY, POST OP BLEEDING (ENGLISH)      24 Hour Appointment Hotline       To make an appointment at any [facility] clinic, call [phone] ([phone]). If you don't have a family doctor or clinic, we will help you find one. [facility] clinics are conveniently located to serve the needs of you and your family.             Review of your medicines      Our records show that you are taking the medicines listed below. If these are incorrect, please call your family doctor or clinic.        Dose / Directions Last dose taken    OXYCODONE HCL PO        Refills:  0                Procedures and tests performed during your visit     Hemoglobin    INR    PTT    Peripheral IV catheter      Orders Needing Specimen Collection     None      Pending Results     No orders found from 8/10/2017 to 8/13/2017.            Pending Culture Results     No orders found from 8/10/2017 to 8/13/2017.            Pending Results Instructions     If you had any lab results that were not finalized at the time of your Discharge, you can call the ED Lab  Result RN at [phone]. You will be contacted by this team for any positive Lab results or changes in treatment. The nurses are available 7 days a week from 10A to 6:30P.  You can leave a message 24 hours per day and they will return your call.        Test Results From Your Hospital Stay        8/12/2017 10:08 AM      Component Results     Component Value Ref Range & Units Status    Hemoglobin 11.6 (L) 11.7 - 15.7 g/dL Final         8/12/2017 12:18 PM      Component Results     Component Value Ref Range & Units Status    INR 1.04 0.86 - 1.14 Final         8/12/2017 12:18 PM      Component Results     Component Value Ref Range & Units Status    PTT 35 22 - 37 sec Final                Clinical Quality Measure: Blood Pressure Screening     Your blood pressure was checked while you were in the emergency department today. The last reading we obtained was  BP: 121/74 . Please read the guidelines below about what these numbers mean and what you should do about them.  If your systolic blood pressure (the top number) is less than 120 and your diastolic blood pressure (the bottom number) is less than 80, then your blood pressure is normal. There is nothing more that you need to do about it.  If your systolic blood pressure (the top number) is 120-139 or your diastolic blood pressure (the bottom number) is 80-89, your blood pressure may be higher than it should be. You should have your blood pressure rechecked within a year by a primary care provider.  If your systolic blood pressure (the top number) is 140 or greater or your diastolic blood pressure (the bottom number) is 90 or greater, you may have high blood pressure. High blood pressure is treatable, but if left untreated over time it can put you at risk for heart attack, stroke, or kidney failure. You should have your blood pressure rechecked by a primary care provider within the next 4 weeks.  If your provider in the emergency department today gave you specific  "instructions to follow-up with your doctor or provider even sooner than that, you should follow that instruction and not wait for up to 4 weeks for your follow-up visit.        Thank you for choosing [facility]       Thank you for choosing [facility] for your care. Our goal is always to provide you with excellent care. Hearing back from our patients is one way we can continue to improve our services. Please take a few minutes to complete the written survey that you may receive in the mail after you visit with us. Thank you!        [employer] Information     [employer] lets you send messages to your doctor, view your test results, renew your prescriptions, schedule appointments and more. To sign up, go to [email or web address] . Click on \"Log in\" on the left side of the screen, which will take you to the Welcome page. Then click on \"Sign up Now\" on the right side of the page.     You will be asked to enter the access code listed below, as well as some personal information. Please follow the directions to create your username and password.     Your access code is: [email or web address]  Expires: 2017 11:43 PM     Your access code will  in 90 days. If you need help or a new code, please call your [facility] clinic or [phone].        Care EveryWhere ID     This is your Care EveryWhere ID. This could be used by other organizations to access your [facility] medical records  [id number]        Equal Access to Services     JAIMEE ROBINS : Hadii myke Martinez, waaxda saad, qaybta kaalmaroxana bryan . So [facility] [phone].    ATENCIÓN: Si habla español, tiene a issa disposición servicios gratuitos de asistencia lingüística. Llame al [phone].    We comply with applicable federal civil rights laws and [place] laws. We do not discriminate on the basis of race, color, national origin, age, disability sex, sexual orientation or gender identity.            After Visit Summary  "      This is your record. Keep this with you and show to your community pharmacist(s) and doctor(s) at your next visit.

## 2017-08-12 NOTE — ED PROVIDER NOTES
History     Chief Complaint:  Post operative bleeding    HPI   Dennise Lara is a 21 year old normally healthy female who presents with post operative bleeding. The patient states that she had a tonsillectomy procedure on August 2nd and that she has since had difficulties with recurrent bleeding. She has been here in the ED twice since the surgery for this same concern and has gone back to the OR in order to receive cauterization two days ago. She feels like she is getting progressively weaker but not to an alarming degree. She is able to eat soft foods and drink fluids without trouble and states that she does not feel dehydrated. The father notes that ice water does seem to slow down the bleeding but she is needing to cough up blood approximately every 15 seconds.The patient and father deny any personal or family history of bleeding disorders.    Allergies:  Acyclovir  Zithromax    Medications:    Oxycodone    Past Medical History:    Allergic rhinitis  Shingles    Past Surgical History:    Return tonsil bleed  Tonsillectomy    Family History:    Allergies    Social History:  The patient was accompanied to the ED by her father.  Smoking Status: No  Smokeless Tobacco: No  Alcohol Use: No   Marital Status:  Single [1]    Review of Systems   HENT:        Post operative tonsil bleeding and pain   Neurological: Negative for dizziness and weakness.   All other systems reviewed and are negative.    Physical Exam   Vitals:  Patient Vitals for the past 24 hrs:   BP Temp Temp src Heart Rate Resp SpO2   08/12/17 1215 125/76 - - - - 94 %   08/12/17 1200 - - - - - 95 %   08/12/17 1145 125/76 - - - - 94 %   08/12/17 1130 124/79 - - - - 96 %   08/12/17 1115 127/87 - - - - 96 %   08/12/17 1100 (!) 138/93 - - - - 96 %   08/12/17 1045 (!) 141/108 - - - - 98 %   08/12/17 1030 110/80 - - - - 94 %   08/12/17 1015 114/71 - - - - 94 %   08/12/17 1000 122/69 - - - - -   08/12/17 0945 124/85 - - - - 99 %   08/12/17 0940 120/85 97.4  F  (36.3  C) Temporal 103 16 99 %         Physical Exam  General: Adolescent female sitting up in bed  Eyes: PERRL, Conjunctive within normal limits  ENT: Moist mucous membranes, oropharynx clear. Granulation tissue and clot bilaterally on tonsils with bright red blood oozing, right tonsil more prominent oozing than the left. No pulsatile bleeding visualized.   CV: Normal S1S2, no murmur, rub or gallop. tachycardic rate and regular rhythm  Resp: Clear to auscultation bilaterally, no wheezes, rales or rhonchi. Normal respiratory effort.  GI: Abdomen is soft, nontender and nondistended. No palpable masses. No rebound or guarding.  MSK: No edema. Nontender. Normal active range of motion.  Skin: Warm and dry. No rashes or lesions or ecchymoses on visible skin.  Neuro: Alert and oriented. Responds appropriately to all questions and commands. No focal findings appreciated. Normal muscle tone.  Psych: Normal mood and affect. Pleasant.   Emergency Department Course     Laboratory:  Laboratory findings were communicated with the patient who voiced understanding of the findings.  Hemoglobin: 11.6(L)  INR: 1.04  PTT: 35    Interventions:  1053 Fentanyl 50 mcg IV  1128 Fentanyl 50 mcg IV    Emergency Department Course:  Nursing notes and vitals reviewed.  I performed an exam of the patient as documented above.     0995 I spoke with Dr. Jimenes regarding the patient  Dr Jimenes consulted, evaluated, and treated in the emergency department. Please see his note for details.    Patient is spitting up blood approximately every 15 seconds. She denies shortness of breath or dizziness.     I discussed the treatment plan with the patient. They expressed understanding of this plan and consented to discharge. They will be discharged home with instructions for care and follow up. In addition, the patient will return to the emergency department if their symptoms persist, worsen, if new symptoms arise or if there is any concern.  All questions were  answered.     I personally reviewed the laboratory results with the Patient and answered all related questions prior to discharge.    Impression & Plan      Medical Decision Making:  Dnenise Lara is a 21 year old female who presents to the emergency department today with concerns for recurrent bleeding post tonsillectomy. Pain was being controled at home with Oxycodone. Here in the emergerncy department she is a little tachycardic on presentation. Her hemoglobin dropped a slight amount normal with a small amount of bleeding post surgical but not overly concerning. She was not hemodynamically unstable. Dr. Jimenes of ENT came to evaluate the patient and did beside cautery which seemed to resolve her bleeding. After brief observation period in the ED she had no ongoing bleeding. Pain was controlled here with both IV and oral narcotics. As she had cessation of bleeding at this time it is appropriate for discharge, She was given appropriate precautions for which to return. She has follow arranged already with Dr. Arthur earlier next week and should keep this scheduled appointment. Return to the emergency department with recurrent bleeding, fever, or any other concerns. All questions answered prior to discharge.    Diagnosis:    ICD-10-CM    1. Post-tonsillectomy hemorrhage J95.89 INR     PTT   2. Post-tonsillectomy pain G89.18     Z90.89       Disposition:   Discharged    Scribe Disclosure:  I, Mumtaz Reed, am serving as a scribe at 9:46 AM on 8/12/2017 to document services personally performed by Xiomara Edmondson MD, based on my observations and the provider's statements to me.   [facility] EMERGENCY DEPARTMENT       Xiomara Edmondson MD  08/13/17 7218

## 2017-08-13 ASSESSMENT — ENCOUNTER SYMPTOMS: DIZZINESS: 0

## (undated) DEVICE — LINEN FULL SHEET 5511

## (undated) DEVICE — ESU GROUND PAD ADULT W/CORD E7507

## (undated) DEVICE — Device

## (undated) DEVICE — ESU SUCTION CAUTERY 10FR FOOT CONTROL E2505-10FR

## (undated) DEVICE — SOL WATER IRRIG 1000ML BOTTLE 2F7114

## (undated) DEVICE — GLOVE PROTEXIS W/NEU-THERA 7.5  2D73TE75

## (undated) DEVICE — PACK T&A RIDGES

## (undated) DEVICE — SUCTION CANISTER MEDIVAC LINER 3000ML W/LID 65651-530

## (undated) DEVICE — SPONGE TONSIL W/STRING MED D30-037

## (undated) DEVICE — LINEN HALF SHEET 5512

## (undated) DEVICE — DECANTER VIAL 2006S

## (undated) DEVICE — BAG CLEAR TRASH 1.3M 39X33" P4040C

## (undated) RX ORDER — FENTANYL CITRATE 50 UG/ML
INJECTION, SOLUTION INTRAMUSCULAR; INTRAVENOUS
Status: DISPENSED
Start: 2017-08-10

## (undated) RX ORDER — OXYCODONE HCL 5 MG/5 ML
SOLUTION, ORAL ORAL
Status: DISPENSED
Start: 2017-08-10

## (undated) RX ORDER — GLYCOPYRROLATE 0.2 MG/ML
INJECTION INTRAMUSCULAR; INTRAVENOUS
Status: DISPENSED
Start: 2017-08-10

## (undated) RX ORDER — PROPOFOL 10 MG/ML
INJECTION, EMULSION INTRAVENOUS
Status: DISPENSED
Start: 2017-08-10

## (undated) RX ORDER — ONDANSETRON 2 MG/ML
INJECTION INTRAMUSCULAR; INTRAVENOUS
Status: DISPENSED
Start: 2017-08-10

## (undated) RX ORDER — MEPERIDINE HYDROCHLORIDE 25 MG/ML
INJECTION INTRAMUSCULAR; INTRAVENOUS; SUBCUTANEOUS
Status: DISPENSED
Start: 2017-08-10

## (undated) RX ORDER — DEXAMETHASONE SODIUM PHOSPHATE 4 MG/ML
INJECTION, SOLUTION INTRA-ARTICULAR; INTRALESIONAL; INTRAMUSCULAR; INTRAVENOUS; SOFT TISSUE
Status: DISPENSED
Start: 2017-08-10

## (undated) RX ORDER — LIDOCAINE HYDROCHLORIDE 10 MG/ML
INJECTION, SOLUTION EPIDURAL; INFILTRATION; INTRACAUDAL; PERINEURAL
Status: DISPENSED
Start: 2017-08-10